# Patient Record
Sex: MALE | Race: WHITE | NOT HISPANIC OR LATINO | URBAN - METROPOLITAN AREA
[De-identification: names, ages, dates, MRNs, and addresses within clinical notes are randomized per-mention and may not be internally consistent; named-entity substitution may affect disease eponyms.]

---

## 2019-08-27 ENCOUNTER — EMERGENCY (EMERGENCY)
Facility: HOSPITAL | Age: 34
LOS: 1 days | Discharge: HOME | End: 2019-08-27
Attending: EMERGENCY MEDICINE | Admitting: EMERGENCY MEDICINE
Payer: OTHER MISCELLANEOUS

## 2019-08-27 VITALS
SYSTOLIC BLOOD PRESSURE: 133 MMHG | OXYGEN SATURATION: 100 % | RESPIRATION RATE: 18 BRPM | DIASTOLIC BLOOD PRESSURE: 87 MMHG | HEART RATE: 109 BPM | TEMPERATURE: 98 F

## 2019-08-27 PROCEDURE — 99284 EMERGENCY DEPT VISIT MOD MDM: CPT

## 2019-08-27 PROCEDURE — 99053 MED SERV 10PM-8AM 24 HR FAC: CPT

## 2019-08-27 PROCEDURE — 73130 X-RAY EXAM OF HAND: CPT | Mod: 26,RT

## 2019-08-27 RX ORDER — ACETAMINOPHEN 500 MG
975 TABLET ORAL ONCE
Refills: 0 | Status: COMPLETED | OUTPATIENT
Start: 2019-08-27 | End: 2019-08-27

## 2019-08-27 RX ORDER — TETANUS TOXOID, REDUCED DIPHTHERIA TOXOID AND ACELLULAR PERTUSSIS VACCINE, ADSORBED 5; 2.5; 8; 8; 2.5 [IU]/.5ML; [IU]/.5ML; UG/.5ML; UG/.5ML; UG/.5ML
0.5 SUSPENSION INTRAMUSCULAR ONCE
Refills: 0 | Status: COMPLETED | OUTPATIENT
Start: 2019-08-27 | End: 2019-08-27

## 2019-08-27 RX ADMIN — Medication 975 MILLIGRAM(S): at 08:10

## 2019-08-27 RX ADMIN — TETANUS TOXOID, REDUCED DIPHTHERIA TOXOID AND ACELLULAR PERTUSSIS VACCINE, ADSORBED 0.5 MILLILITER(S): 5; 2.5; 8; 8; 2.5 SUSPENSION INTRAMUSCULAR at 07:58
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
## 2019-08-27 NOTE — ED PROVIDER NOTE - NSFOLLOWUPINSTRUCTIONS_ED_ALL_ED_FT
- Follow up with your primary care doctor in 1-2 days.    - Bring results with you to the appointment.   - Take tylenol 650mg or motrin 600mg every 6 hours for pain.  - Return to the ED for new or worsening symptoms.
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
## 2019-08-27 NOTE — ED PROVIDER NOTE - PHYSICAL EXAMINATION
Gen: WDWN, NAD  HEENT: EOMI,  mucous membranes moist  CV: RRR, +S1/S2, no M/R/G  Resp: CTAB, no W/R/R  GI: Abdomen soft non-distended  MSK: Small abrasion, not actively bleeding on the dorsum of the right hand with edema and ttp. 2+pulses, sensation intact. decreased ROM of the middle and ring finger at the MCP and PIP joint with ttp at the MCP. No ttp at the anatomical snuff box. Full ROM of the wrist without pain. full ROM of the elbow without pain or ttp.   Neuro: A&Ox4, following commands, moving all four extremities spontaneously  Psych: appropriate mood Gen: WDWN, NAD  HEENT: EOMI,  mucous membranes moist  CV: RRR, +S1/S2, no M/R/G  Resp: CTAB, no W/R/R  GI: Abdomen soft non-distended  MSK: Small abrasion, not actively bleeding on the dorsum of the right hand with edema and ttp. 2+pulses, sensation intact. decreased ROM of the index and middle finger at the MCP and PIP joint with ttp at the MCP. No ttp at the anatomical snuff box. Full ROM of the wrist without pain. full ROM of the elbow without pain or ttp.   Neuro: A&Ox4, following commands, moving all four extremities spontaneously  Psych: appropriate mood Gen: WDWN, NAD  HEENT: EOMI,  mucous membranes moist  CV: RRR, +S1/S2, no M/R/G  Resp: CTAB, no W/R/R  GI: Abdomen soft non-distended  MSK: Small abrasion, not actively bleeding on the dorsum of the right hand with edema and ttp. 2+pulses, sensation intact. decreased ROM of the index and middle finger at the MCP and PIP joint with ttp at the MCP. not able to make a fist. No ttp at the anatomical snuff box. Full ROM of the wrist without pain. full ROM of the elbow without pain or ttp.   Neuro: A&Ox4, following commands, moving all four extremities spontaneously  Psych: appropriate mood
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
## 2019-08-27 NOTE — ED PROVIDER NOTE - NS ED ROS FT
CV: Denies chest pain  Skin: small abrasion on right hand.   Resp: Denies SOB  Msk: Reports Right hand pain.

## 2019-08-27 NOTE — ED ADULT TRIAGE NOTE - NS ED TRIAGE AVPU SCALE
Alert-The patient is alert, awake and responds to voice. The patient is oriented to time, place, and person. The triage nurse is able to obtain subjective information. Stage 3: Additional Anesthesia Type: 1% lidocaine with epinephrine and a 1:10 solution of 8.4% sodium bicarbonate
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
## 2023-05-08 PROBLEM — Z00.00 ENCOUNTER FOR PREVENTIVE HEALTH EXAMINATION: Status: ACTIVE | Noted: 2023-05-08
